# Patient Record
Sex: MALE | Race: WHITE
[De-identification: names, ages, dates, MRNs, and addresses within clinical notes are randomized per-mention and may not be internally consistent; named-entity substitution may affect disease eponyms.]

---

## 2017-10-27 ENCOUNTER — HOSPITAL ENCOUNTER (EMERGENCY)
Dept: HOSPITAL 33 - ED | Age: 27
Discharge: HOME | End: 2017-10-27
Payer: SELF-PAY

## 2017-10-27 VITALS — OXYGEN SATURATION: 96 %

## 2017-10-27 VITALS — SYSTOLIC BLOOD PRESSURE: 115 MMHG | DIASTOLIC BLOOD PRESSURE: 53 MMHG | HEART RATE: 102 BPM

## 2017-10-27 DIAGNOSIS — F10.129: ICD-10-CM

## 2017-10-27 DIAGNOSIS — R51: ICD-10-CM
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
## 2017-10-27 DIAGNOSIS — S01.112A: Primary | ICD-10-CM

## 2017-10-27 PROCEDURE — 99284 EMERGENCY DEPT VISIT MOD MDM: CPT

## 2017-10-27 PROCEDURE — 90471 IMMUNIZATION ADMIN: CPT

## 2017-10-27 PROCEDURE — 70450 CT HEAD/BRAIN W/O DYE: CPT

## 2017-10-27 PROCEDURE — 0HQ1XZZ REPAIR FACE SKIN, EXTERNAL APPROACH: ICD-10-PCS

## 2017-10-27 PROCEDURE — 12011 RPR F/E/E/N/L/M 2.5 CM/<: CPT

## 2017-10-27 PROCEDURE — 36415 COLL VENOUS BLD VENIPUNCTURE: CPT

## 2017-10-27 PROCEDURE — 36000 PLACE NEEDLE IN VEIN: CPT

## 2017-10-27 PROCEDURE — 90715 TDAP VACCINE 7 YRS/> IM: CPT

## 2017-10-27 PROCEDURE — 70482 CT ORBIT/EAR/FOSSA W/O&W/DYE: CPT

## 2017-10-27 PROCEDURE — 96372 THER/PROPH/DIAG INJ SC/IM: CPT
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
## 2017-10-28 NOTE — XRAY
Exam: CT of the orbits without and with IV contrast from 10/27/2017.



Comparison: None.



Indication: Patient fell hitting left frontal area and upper aspect of left

eye on recliner.  Clinical concern for blowout fracture.



Technique: Axial images were obtained through the orbits and paranasal sinuses

without and with 80 cc of Isovue 370 IV contrast material.  Reconstructed

coronal and sagittal images were created and reviewed.



Findings: I again see some anterior left periorbital soft tissue swelling.

The globe of each eye, extraocular muscles, and optic nerves appear

unremarkable within each orbit.  No mass is seen.  I see no evidence of

orbital fracture, particularly involving the orbital floor.  No significant

sinus opacification or air-fluid levels are seen.  The infundibulum on the

left is narrow, but patent.  The infundibulum of the right ostiomeatal complex

is compromised by some minimal adjacent mucosal soft tissue.  There is also

some minimal mucosal thickening along the lower medial margin of each

maxillary sinus and the right aspect of the sphenoid sinus.  Bilateral sivan

bullosa are seen, larger on the right than left.  There is some deviation of

the posterior aspect of the nasal septum toward the left as well as the

presence of a septal spur on this side.  See coronal image #58.  The

temporomandibular joints appear unremarkable.



Impression:



1.  Mild anterior left periorbital soft tissue swelling is seen.



2.  No abnormality of either eye or orbit is seen.  There is no orbital

fracture or paranasal sinus air-fluid levels.



3.  Scant scattered mucosal thickening within the paranasal sinuses, probably

chronic.  Some other incidental findings are seen within the paranasal

sinuses, as discussed above..

## 2017-10-28 NOTE — XRAY
Exam: CT of the head without IV contrast from 10/27/2017.              CTDI:

52.39



Comparison: CT of the head without IV contrast from 11/3/2016.



Indication:: Patient fell striking left frontal area of head and upper left

eye on recliner,



Technique: Non-IV contrast axial images were obtained through the brain.

Reconstructed coronal and sagittal images were created and reviewed.



Findings: The ventricles are of normal size.  No focal mass effect or midline

shift is seen.  No acute intracranial bleed or abnormal extra-axial fluid

collection is seen.  The gray matter-white matter interfaces appear

unremarkable.  No focal low attenuation edema or acute territorial infarction

is seen.  The cortical sulci and basilar cisterns appear normal.



There is some mild anterior left periorbital soft tissue swelling.  I see no

evidence of fracture of the calvarium of the skull.  The visualized paranasal

sinuses are clear.  There is some deviation of the posterior aspect of the

nasal septum toward the left.  A sivan bullosa is seen within the right

middle nasal turbinate.  Mastoid air cells appear unremarkable.



Impression:



1.  I see no evidence of acute intracranial bleed or other acute intracranial

process.



2.  No acute fracture of the calvarium of the skull is seen.



3.  Some anterior left periorbital soft tissue swelling is seen.  No

underlying fracture is seen at this level.  The visualized paranasal sinuses

appear unremarkable.

## 2017-12-29 ENCOUNTER — HOSPITAL ENCOUNTER (EMERGENCY)
Dept: HOSPITAL 33 - ED | Age: 27
Discharge: HOME | End: 2017-12-29
Payer: COMMERCIAL

## 2017-12-29 VITALS — SYSTOLIC BLOOD PRESSURE: 133 MMHG | HEART RATE: 96 BPM | DIASTOLIC BLOOD PRESSURE: 87 MMHG

## 2017-12-29 VITALS — OXYGEN SATURATION: 98 %

## 2017-12-29 DIAGNOSIS — F10.129: Primary | ICD-10-CM

## 2017-12-29 DIAGNOSIS — T50.995A: ICD-10-CM

## 2017-12-29 LAB
ALBUMIN SERPL-MCNC: 4.1 G/DL (ref 3.4–5)
ALP SERPL-CCNC: 125 U/L (ref 46–116)
ALT SERPL-CCNC: 17 U/L (ref 12–78)
AMPHETAMINES UR QL: (no result)
ANION GAP SERPL CALC-SCNC: 16.3 MEQ/L (ref 5–15)
APAP SPEC-MCNC: < 2 UG/ML (ref 10–30)
AST SERPL QL: 18 U/L (ref 15–37)
BARBITURATES UR QL: (no result)
BASOPHILS # BLD AUTO: 0.03 10*3/UL (ref 0–0.4)
BASOPHILS NFR BLD AUTO: 0.4 % (ref 0–0.4)
BENZODIAZ UR QL SCN: (no result)
BILIRUB BLD-MCNC: 0.2 MG/DL (ref 0.2–1)
BUN SERPL-MCNC: 16 MG/DL (ref 9–20)
CALCIUM SPEC-MCNC: 9.1 MG/DL (ref 8.5–10.1)
CHLORIDE SERPL-SCNC: 102 MEQ/L (ref 98–107)
CO2 SERPL-SCNC: 26.7 MEQ/L (ref 21–32)
COCAINE UR QL SCN: (no result)
CREAT SERPL-MCNC: 1.28 MG/DL (ref 0.55–1.3)
EOSINOPHIL # BLD AUTO: 0.08 10*3/UL (ref 0–0.5)
ETHANOL SERPL-MCNC: 0.11 % (ref 0–0.01)
GFR SERPLBLD BASED ON 1.73 SQ M-ARVRAT: > 60 ML/MIN
GLUCOSE SERPL-MCNC: 116 MG/DL (ref 70–110)
GLUCOSE UR-MCNC: 500 MG/DL
GRANULOCYTES # BLD AUTO: 3.86 10*3/UL (ref 1.4–6.9)
HCT VFR BLD AUTO: 45.3 % (ref 42–50)
HGB BLD-MCNC: 14.7 GM/DL (ref 12.5–18)
LYMPHOCYTES # SPEC AUTO: 2.5 10*3/UL (ref 1–4.6)
MCH RBC QN AUTO: 30.1 PG (ref 26–32)
MCHC RBC AUTO-ENTMCNC: 32.5 G/DL (ref 32–36)
METHADONE UR QL: (no result)
MONOCYTES # BLD AUTO: 0.54 10*3/UL (ref 0–1.3)
NEUTROPHILS NFR BLD AUTO: 55.1 % (ref 36–66)
OPIATES UR QL: (no result)
PCP UR QL CFM>20 NG/ML: (no result)
PLATELET # BLD AUTO: 313 K/MM3 (ref 150–450)
POTASSIUM SERPLBLD-SCNC: 4.5 MEQ/L (ref 3.5–5.1)
PROT SERPL-MCNC: 7.7 GM/DL (ref 6.4–8.2)
PROT UR STRIP-MCNC: NEGATIVE MG/DL
RBC # BLD AUTO: 4.89 M/MM3 (ref 4.1–5.6)
SODIUM SERPL-SCNC: 141 MEQ/L (ref 136–145)
THC UR QL SCN: (no result)
WBC # BLD AUTO: 7 K/MM3 (ref 4–10.5)

## 2017-12-29 PROCEDURE — 80053 COMPREHEN METABOLIC PANEL: CPT

## 2017-12-29 PROCEDURE — 85025 COMPLETE CBC W/AUTO DIFF WBC: CPT

## 2017-12-29 PROCEDURE — 96360 HYDRATION IV INFUSION INIT: CPT

## 2017-12-29 PROCEDURE — 83605 ASSAY OF LACTIC ACID: CPT

## 2017-12-29 PROCEDURE — 81002 URINALYSIS NONAUTO W/O SCOPE: CPT

## 2017-12-29 PROCEDURE — 96361 HYDRATE IV INFUSION ADD-ON: CPT

## 2017-12-29 PROCEDURE — 96374 THER/PROPH/DIAG INJ IV PUSH: CPT

## 2017-12-29 PROCEDURE — 80307 DRUG TEST PRSMV CHEM ANLYZR: CPT

## 2017-12-29 PROCEDURE — 36415 COLL VENOUS BLD VENIPUNCTURE: CPT

## 2017-12-29 PROCEDURE — 99284 EMERGENCY DEPT VISIT MOD MDM: CPT
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
## 2018-12-28 ENCOUNTER — HOSPITAL ENCOUNTER (EMERGENCY)
Dept: HOSPITAL 33 - ED | Age: 28
Discharge: ANOTHER HOSPITAL | End: 2018-12-28
Payer: COMMERCIAL

## 2018-12-28 VITALS — OXYGEN SATURATION: 99 %

## 2018-12-28 VITALS — SYSTOLIC BLOOD PRESSURE: 114 MMHG | DIASTOLIC BLOOD PRESSURE: 84 MMHG | HEART RATE: 97 BPM

## 2018-12-28 DIAGNOSIS — S02.32XA: ICD-10-CM
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
## 2018-12-28 DIAGNOSIS — S02.40DA: ICD-10-CM

## 2018-12-28 DIAGNOSIS — S00.12XA: ICD-10-CM

## 2018-12-28 DIAGNOSIS — S22.32XA: Primary | ICD-10-CM

## 2018-12-28 PROCEDURE — 71100 X-RAY EXAM RIBS UNI 2 VIEWS: CPT

## 2018-12-28 PROCEDURE — 99284 EMERGENCY DEPT VISIT MOD MDM: CPT

## 2018-12-28 PROCEDURE — 73130 X-RAY EXAM OF HAND: CPT

## 2018-12-28 PROCEDURE — 72125 CT NECK SPINE W/O DYE: CPT

## 2018-12-28 PROCEDURE — 71046 X-RAY EXAM CHEST 2 VIEWS: CPT

## 2018-12-28 PROCEDURE — 70450 CT HEAD/BRAIN W/O DYE: CPT

## 2018-12-28 PROCEDURE — 70486 CT MAXILLOFACIAL W/O DYE: CPT
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
